# Patient Record
Sex: FEMALE | Race: WHITE
[De-identification: names, ages, dates, MRNs, and addresses within clinical notes are randomized per-mention and may not be internally consistent; named-entity substitution may affect disease eponyms.]

---

## 2018-01-17 ENCOUNTER — HOSPITAL ENCOUNTER (OUTPATIENT)
Dept: HOSPITAL 43 - DL.SDS | Age: 78
Discharge: HOME | End: 2018-01-17
Attending: OPHTHALMOLOGY
Payer: MEDICARE

## 2018-01-17 DIAGNOSIS — I10: ICD-10-CM

## 2018-01-17 DIAGNOSIS — E11.9: ICD-10-CM

## 2018-01-17 DIAGNOSIS — E78.5: ICD-10-CM

## 2018-01-17 DIAGNOSIS — F17.210: ICD-10-CM

## 2018-01-17 DIAGNOSIS — Z88.1: ICD-10-CM

## 2018-01-17 DIAGNOSIS — Z79.84: ICD-10-CM

## 2018-01-17 DIAGNOSIS — H26.9: Primary | ICD-10-CM

## 2018-01-17 DIAGNOSIS — Z79.899: ICD-10-CM

## 2018-01-17 DIAGNOSIS — E03.9: ICD-10-CM

## 2018-01-17 DIAGNOSIS — Z79.82: ICD-10-CM

## 2018-01-17 PROCEDURE — 82962 GLUCOSE BLOOD TEST: CPT

## 2018-01-17 PROCEDURE — 66984 XCAPSL CTRC RMVL W/O ECP: CPT

## 2018-01-17 PROCEDURE — V2787 ASTIGMATISM-CORRECT FUNCTION: HCPCS

## 2018-01-17 NOTE — OR
DATE:  01/17/2018

 

PREOPERATIVE DIAGNOSIS:  Cataract, left eye.

 

POSTOPERATIVE DIAGNOSIS:  Cataract, left eye.

 

PROCEDURE:  Extracapsular cataract extraction with intraocular lens implant,

left eye.

 

ANESTHESIA:  Topical/local MAC.

 

COMPLICATIONS:  None.

 

INDICATION:  Mrs. Diana was seen in the clinic.  She has complained of a

progressive decrease in vision.  Her clinical examination reveals visually

significant mixed cataract with best spectacle corrected vision at the level of

2200.  I explained options, I offered cataract surgery and I explained risks.

She is symptomatic and requested surgery to improve vision and function.  Her

examination also reveals epiretinal membrane in the left eye.  I did explain

that her visual potential may be slightly limited by retinal health.  She has

significant preexisting corneal astigmatism.  We discussed implant options and

she has requested a toric implant.  She understands that she may still require

glasses for some activities.

 

OPERATIVE DESCRIPTION:  After informed consent was obtained and the risks,

benefits, and alternatives were explained, the patient was brought to the

operative suite and topical anesthesia was administered.  The patient was then

prepped and draped in the sterile fashion and attention was placed on the left

eye.  A sterile lid speculum was placed into the left eye to allow operative

exposure. A full-thickness paracentesis was made in the temporal portion of the

operative eye. Preservative-free lidocaine 0.1 mL was injected into the anterior

chamber followed by viscoelastic. A full-thickness corneal incision was then

made into the anterior chamber.  A bent needle cystotome was used to create a

small nick in the anterior capsule. The capsulorrhexis forceps were then used to

create a 360-degree curvilinear capsulorrhexis.  The nucleus was then removed

using a phacoemulsification handpiece and the remaining cortical material was

then removed with irrigation and aspiration handpiece. Following removal of the

cortical material, the capsular bag was then inspected and noted to be free of

any holes or tears. Viscoelastic was then injected into the capsular bag and the

intraocular lens was inserted into the capsular bag.  The implant was oriented

to correspond with preoperative corneal marks made with the patient in the

upright position.  The viscoelastic material was then removed from both the

anterior and posterior chambers and from behind the IOL. The lens and capsular

bag were then reinspected. The IOL was well centered and the capsular bag

intact. The wound and paracentesis sites were inspected and hydrated with

balanced saline solution. Both were found to be self-sealing. The intraocular

pressure was assessed digitally and found to be within normal range. A good red

reflex was noted at the completion of the procedure. No complications occurred

during the operation. At the completion of the procedure, Maxitrol, Voltaren,

and Iopidine drops were placed into the operative eye. A sterile eye shield was

placed over the operative eye and the patient was transported to the

postoperative recovery area having tolerated the procedure well. Postoperative

instructions were given along with a postoperative appointment.  The patient was

advised to call with any questions or concerns.

 

DD:  01/17/2018 08:47:19

DT:  01/17/2018 10:33:05

[facility]

Job #:  [id number]

## 2018-01-24 ENCOUNTER — HOSPITAL ENCOUNTER (OUTPATIENT)
Dept: HOSPITAL 43 - DL.SDS | Age: 78
Discharge: HOME | End: 2018-01-24
Attending: OPHTHALMOLOGY
Payer: MEDICARE

## 2018-01-24 DIAGNOSIS — Z79.82: ICD-10-CM

## 2018-01-24 DIAGNOSIS — E03.9: ICD-10-CM

## 2018-01-24 DIAGNOSIS — Z79.899: ICD-10-CM

## 2018-01-24 DIAGNOSIS — E11.9: ICD-10-CM

## 2018-01-24 DIAGNOSIS — Z88.1: ICD-10-CM

## 2018-01-24 DIAGNOSIS — E78.5: ICD-10-CM

## 2018-01-24 DIAGNOSIS — F17.210: ICD-10-CM

## 2018-01-24 DIAGNOSIS — H26.8: Primary | ICD-10-CM

## 2018-01-24 DIAGNOSIS — I10: ICD-10-CM

## 2018-01-24 PROCEDURE — 66984 XCAPSL CTRC RMVL W/O ECP: CPT

## 2018-01-24 PROCEDURE — 00142 ANES PX ON EYE LENS SURGERY: CPT

## 2018-01-24 PROCEDURE — 82962 GLUCOSE BLOOD TEST: CPT

## 2018-01-24 PROCEDURE — C1780 LENS, INTRAOCULAR (NEW TECH): HCPCS

## 2018-01-24 NOTE — OR
DATE:  01/24/2018

 

PREOPERATIVE DIAGNOSIS:  Cataract, right eye.

 

POSTOPERATIVE DIAGNOSIS:  Cataract, right eye.

 

PROCEDURE:  Extracapsular cataract extraction with intraocular lens implant,

right eye.

 

ANESTHESIA:  Topical/local MAC.

 

COMPLICATIONS:  None.

 

INDICATION:  Ms. Diana was seen in the clinic with complaints of blurred

vision.  Her clinical examination revealed visually significant mixed cataract.

I explained options, I offered cataract surgery, and I explained risks.  She is

symptomatic and requested surgery to improve vision and function.  She requested

a monofocal implant.

 

OPERATIVE DESCRIPTION:  After informed consent was obtained and the risks,

benefits, and alternatives were explained, the patient was brought to the

operative suite and topical anesthesia was administered.  The patient was then

prepped and draped in the sterile fashion and attention was placed on the right

eye.  A sterile lid speculum was placed into the right eye to allow operative

exposure.  A full-thickness paracentesis was made in the temporal portion of the

operative eye.  Preservative-free lidocaine 0.1 mL was injected into the

anterior chamber followed by viscoelastic.  A full-thickness corneal incision

was then made into the anterior chamber.  A bent needle cystotome was used to

create a small nick in the anterior capsule.  The capsulorrhexis forceps was

then used to create a 360-degree curvilinear capsulorrhexis.  The nucleus was

then removed using a phacoemulsification handpiece, and the remaining cortical

material was then removed with irrigation and aspiration handpiece.  Following

removal of the cortical material, the capsular bag was then inspected and noted

to be free of any holes or tears.  Viscoelastic was then injected into the

capsular bag, and the intraocular lens was inserted into the capsular bag.  The

viscoelastic material was then removed from both the anterior and posterior

chambers and from behind the IOL.  The lens and capsular bag were then

reinspected.  The IOL was well centered and the capsular bag intact.  The wound

and paracentesis sites were inspected and hydrated with balanced saline

solution.  Both were found to be self-sealing.  The intraocular pressure was

assessed digitally and found to be within normal range.  A good red reflex was

noted at the completion of the procedure.  No complications occurred during the

operation.  At the completion of the procedure, Maxitrol, Voltaren, and Iopidine

drops were placed into the operative eye.  A sterile eye shield was placed over

the operative eye, and the patient was transported to the postoperative recovery

area having tolerated the procedure well.  Postoperative instructions were given

along with a postoperative appointment.  The patient was advised to call with

any questions or concerns.

 

DD:  01/24/2018 12:24:48

DT:  01/24/2018 16:20:47

[facility]

Job #:  [id number]